# Patient Record
Sex: MALE | Race: WHITE | Employment: FULL TIME | URBAN - METROPOLITAN AREA
[De-identification: names, ages, dates, MRNs, and addresses within clinical notes are randomized per-mention and may not be internally consistent; named-entity substitution may affect disease eponyms.]

---

## 2019-05-28 ENCOUNTER — HOSPITAL ENCOUNTER (OUTPATIENT)
Age: 34
Setting detail: OBSERVATION
Discharge: HOME | End: 2019-05-29
Attending: EMERGENCY MEDICINE | Admitting: INTERNAL MEDICINE
Payer: MEDICAID

## 2019-05-28 ENCOUNTER — APPOINTMENT (OUTPATIENT)
Dept: CT IMAGING | Age: 34
End: 2019-05-28
Payer: MEDICAID

## 2019-05-28 DIAGNOSIS — I65.02 STENOSIS OF LEFT VERTEBRAL ARTERY: ICD-10-CM

## 2019-05-28 DIAGNOSIS — R42 DIZZINESS: ICD-10-CM

## 2019-05-28 DIAGNOSIS — R42 VERTIGO: Primary | ICD-10-CM

## 2019-05-28 LAB
ALBUMIN SERPL-MCNC: 4.6 G/DL (ref 3.5–5.2)
ALP BLD-CCNC: 40 U/L (ref 40–129)
ALT SERPL-CCNC: 29 U/L (ref 0–40)
ANION GAP SERPL CALCULATED.3IONS-SCNC: 12 MMOL/L (ref 7–16)
AST SERPL-CCNC: 33 U/L (ref 0–39)
BASOPHILS ABSOLUTE: 0.05 E9/L (ref 0–0.2)
BASOPHILS RELATIVE PERCENT: 0.7 % (ref 0–2)
BILIRUB SERPL-MCNC: 0.5 MG/DL (ref 0–1.2)
BILIRUBIN URINE: NEGATIVE
BLOOD, URINE: NEGATIVE
BUN BLDV-MCNC: 13 MG/DL (ref 6–20)
CALCIUM SERPL-MCNC: 9.9 MG/DL (ref 8.6–10.2)
CHLORIDE BLD-SCNC: 97 MMOL/L (ref 98–107)
CLARITY: CLEAR
CO2: 29 MMOL/L (ref 22–29)
COLOR: YELLOW
CREAT SERPL-MCNC: 1.1 MG/DL (ref 0.7–1.2)
EKG ATRIAL RATE: 51 BPM
EKG P AXIS: 46 DEGREES
EKG P-R INTERVAL: 180 MS
EKG Q-T INTERVAL: 396 MS
EKG QRS DURATION: 90 MS
EKG QTC CALCULATION (BAZETT): 364 MS
EKG R AXIS: 18 DEGREES
EKG T AXIS: 43 DEGREES
EKG VENTRICULAR RATE: 51 BPM
EOSINOPHILS ABSOLUTE: 0.23 E9/L (ref 0.05–0.5)
EOSINOPHILS RELATIVE PERCENT: 3 % (ref 0–6)
GFR AFRICAN AMERICAN: >60
GFR NON-AFRICAN AMERICAN: >60 ML/MIN/1.73
GLUCOSE BLD-MCNC: 89 MG/DL (ref 74–99)
GLUCOSE URINE: NEGATIVE MG/DL
HCT VFR BLD CALC: 44 % (ref 37–54)
HEMOGLOBIN: 14.4 G/DL (ref 12.5–16.5)
IMMATURE GRANULOCYTES #: 0.02 E9/L
IMMATURE GRANULOCYTES %: 0.3 % (ref 0–5)
KETONES, URINE: NEGATIVE MG/DL
LEUKOCYTE ESTERASE, URINE: NEGATIVE
LYMPHOCYTES ABSOLUTE: 2.85 E9/L (ref 1.5–4)
LYMPHOCYTES RELATIVE PERCENT: 37.5 % (ref 20–42)
MAGNESIUM: 2.1 MG/DL (ref 1.6–2.6)
MCH RBC QN AUTO: 29.8 PG (ref 26–35)
MCHC RBC AUTO-ENTMCNC: 32.7 % (ref 32–34.5)
MCV RBC AUTO: 90.9 FL (ref 80–99.9)
MONOCYTES ABSOLUTE: 0.62 E9/L (ref 0.1–0.95)
MONOCYTES RELATIVE PERCENT: 8.1 % (ref 2–12)
NEUTROPHILS ABSOLUTE: 3.84 E9/L (ref 1.8–7.3)
NEUTROPHILS RELATIVE PERCENT: 50.4 % (ref 43–80)
NITRITE, URINE: NEGATIVE
PDW BLD-RTO: 13.8 FL (ref 11.5–15)
PH UA: 7 (ref 5–9)
PLATELET # BLD: 249 E9/L (ref 130–450)
PMV BLD AUTO: 10.5 FL (ref 7–12)
POTASSIUM SERPL-SCNC: 4.3 MMOL/L (ref 3.5–5)
PROTEIN UA: NEGATIVE MG/DL
RBC # BLD: 4.84 E12/L (ref 3.8–5.8)
SODIUM BLD-SCNC: 138 MMOL/L (ref 132–146)
SPECIFIC GRAVITY UA: <=1.005 (ref 1–1.03)
TOTAL PROTEIN: 6.8 G/DL (ref 6.4–8.3)
TROPONIN: <0.01 NG/ML (ref 0–0.03)
TSH SERPL DL<=0.05 MIU/L-ACNC: 4.82 UIU/ML (ref 0.27–4.2)
UROBILINOGEN, URINE: 0.2 E.U./DL
WBC # BLD: 7.6 E9/L (ref 4.5–11.5)

## 2019-05-28 PROCEDURE — 83735 ASSAY OF MAGNESIUM: CPT

## 2019-05-28 PROCEDURE — 93010 ELECTROCARDIOGRAM REPORT: CPT | Performed by: INTERNAL MEDICINE

## 2019-05-28 PROCEDURE — 2580000003 HC RX 258: Performed by: NURSE PRACTITIONER

## 2019-05-28 PROCEDURE — G0378 HOSPITAL OBSERVATION PER HR: HCPCS

## 2019-05-28 PROCEDURE — 81003 URINALYSIS AUTO W/O SCOPE: CPT

## 2019-05-28 PROCEDURE — 70496 CT ANGIOGRAPHY HEAD: CPT

## 2019-05-28 PROCEDURE — 80053 COMPREHEN METABOLIC PANEL: CPT

## 2019-05-28 PROCEDURE — 84443 ASSAY THYROID STIM HORMONE: CPT

## 2019-05-28 PROCEDURE — 99255 IP/OBS CONSLTJ NEW/EST HI 80: CPT | Performed by: INTERNAL MEDICINE

## 2019-05-28 PROCEDURE — 2580000003 HC RX 258: Performed by: RADIOLOGY

## 2019-05-28 PROCEDURE — 6370000000 HC RX 637 (ALT 250 FOR IP): Performed by: INTERNAL MEDICINE

## 2019-05-28 PROCEDURE — 36415 COLL VENOUS BLD VENIPUNCTURE: CPT

## 2019-05-28 PROCEDURE — 93005 ELECTROCARDIOGRAM TRACING: CPT | Performed by: NURSE PRACTITIONER

## 2019-05-28 PROCEDURE — 99285 EMERGENCY DEPT VISIT HI MDM: CPT

## 2019-05-28 PROCEDURE — 6370000000 HC RX 637 (ALT 250 FOR IP): Performed by: NURSE PRACTITIONER

## 2019-05-28 PROCEDURE — 6360000002 HC RX W HCPCS: Performed by: INTERNAL MEDICINE

## 2019-05-28 PROCEDURE — 96372 THER/PROPH/DIAG INJ SC/IM: CPT

## 2019-05-28 PROCEDURE — 84484 ASSAY OF TROPONIN QUANT: CPT

## 2019-05-28 PROCEDURE — 6360000004 HC RX CONTRAST MEDICATION: Performed by: RADIOLOGY

## 2019-05-28 PROCEDURE — 2580000003 HC RX 258: Performed by: INTERNAL MEDICINE

## 2019-05-28 PROCEDURE — 85025 COMPLETE CBC W/AUTO DIFF WBC: CPT

## 2019-05-28 RX ORDER — ONDANSETRON 4 MG/1
4 TABLET, ORALLY DISINTEGRATING ORAL EVERY 8 HOURS PRN
Qty: 24 TABLET | Refills: 0 | Status: SHIPPED | OUTPATIENT
Start: 2019-05-28 | End: 2019-05-29 | Stop reason: SDUPTHER

## 2019-05-28 RX ORDER — SODIUM CHLORIDE 0.9 % (FLUSH) 0.9 %
10 SYRINGE (ML) INJECTION PRN
Status: DISCONTINUED | OUTPATIENT
Start: 2019-05-28 | End: 2019-05-28 | Stop reason: SDUPTHER

## 2019-05-28 RX ORDER — ONDANSETRON 4 MG/1
4 TABLET, ORALLY DISINTEGRATING ORAL EVERY 8 HOURS PRN
Status: DISCONTINUED | OUTPATIENT
Start: 2019-05-28 | End: 2019-05-29 | Stop reason: HOSPADM

## 2019-05-28 RX ORDER — SODIUM CHLORIDE 0.9 % (FLUSH) 0.9 %
10 SYRINGE (ML) INJECTION PRN
Status: DISCONTINUED | OUTPATIENT
Start: 2019-05-28 | End: 2019-05-29 | Stop reason: HOSPADM

## 2019-05-28 RX ORDER — SODIUM CHLORIDE 0.9 % (FLUSH) 0.9 %
10 SYRINGE (ML) INJECTION EVERY 12 HOURS SCHEDULED
Status: DISCONTINUED | OUTPATIENT
Start: 2019-05-28 | End: 2019-05-28 | Stop reason: SDUPTHER

## 2019-05-28 RX ORDER — 0.9 % SODIUM CHLORIDE 0.9 %
1000 INTRAVENOUS SOLUTION INTRAVENOUS ONCE
Status: COMPLETED | OUTPATIENT
Start: 2019-05-28 | End: 2019-05-28

## 2019-05-28 RX ORDER — MECLIZINE HCL 12.5 MG/1
25 TABLET ORAL ONCE
Status: COMPLETED | OUTPATIENT
Start: 2019-05-28 | End: 2019-05-28

## 2019-05-28 RX ORDER — ACETAMINOPHEN 325 MG/1
650 TABLET ORAL EVERY 4 HOURS PRN
Status: DISCONTINUED | OUTPATIENT
Start: 2019-05-28 | End: 2019-05-29 | Stop reason: HOSPADM

## 2019-05-28 RX ORDER — SODIUM CHLORIDE 0.9 % (FLUSH) 0.9 %
10 SYRINGE (ML) INJECTION PRN
Status: COMPLETED | OUTPATIENT
Start: 2019-05-28 | End: 2019-05-28

## 2019-05-28 RX ORDER — ONDANSETRON 2 MG/ML
4 INJECTION INTRAMUSCULAR; INTRAVENOUS EVERY 6 HOURS PRN
Status: DISCONTINUED | OUTPATIENT
Start: 2019-05-28 | End: 2019-05-29 | Stop reason: HOSPADM

## 2019-05-28 RX ORDER — MECLIZINE HYDROCHLORIDE 25 MG/1
25 TABLET ORAL 3 TIMES DAILY PRN
Qty: 30 TABLET | Refills: 0 | Status: SHIPPED | OUTPATIENT
Start: 2019-05-28 | End: 2019-05-29 | Stop reason: SDUPTHER

## 2019-05-28 RX ORDER — SODIUM CHLORIDE 0.9 % (FLUSH) 0.9 %
10 SYRINGE (ML) INJECTION EVERY 12 HOURS SCHEDULED
Status: DISCONTINUED | OUTPATIENT
Start: 2019-05-28 | End: 2019-05-29 | Stop reason: HOSPADM

## 2019-05-28 RX ORDER — ASPIRIN 81 MG/1
81 TABLET, CHEWABLE ORAL DAILY
Status: DISCONTINUED | OUTPATIENT
Start: 2019-05-28 | End: 2019-05-29 | Stop reason: HOSPADM

## 2019-05-28 RX ADMIN — ENOXAPARIN SODIUM 40 MG: 40 INJECTION SUBCUTANEOUS at 10:35

## 2019-05-28 RX ADMIN — Medication 10 ML: at 04:52

## 2019-05-28 RX ADMIN — MECLIZINE 25 MG: 12.5 TABLET ORAL at 03:02

## 2019-05-28 RX ADMIN — ASPIRIN 81 MG 81 MG: 81 TABLET ORAL at 10:35

## 2019-05-28 RX ADMIN — IOPAMIDOL 60 ML: 755 INJECTION, SOLUTION INTRAVENOUS at 04:52

## 2019-05-28 RX ADMIN — Medication 10 ML: at 20:53

## 2019-05-28 RX ADMIN — SODIUM CHLORIDE 1000 ML: 9 INJECTION, SOLUTION INTRAVENOUS at 02:54

## 2019-05-28 NOTE — ED PROVIDER NOTES
independent   HPI:  5/28/19, Time: 1:36 AM         Pam Delgado is a 27 y.o. male presenting to the ED for concerns regarding feeling dizzy and confused. Patient reports that on Thursday he had a headache. States that was to his left forehead area. He reports that he felt dizzy. He states that Friday he just did not feel well all day and in fact slept most of the day away but did report on Friday that he felt confused and felt that he had difficulty expressing his words, he reports he felt drunk. Jeniferia Imani He reports that then Saturday and Sunday he was starting to feel better but now Monday he's once again started to feel just foggy. He denies any recent head injury or fall. He is not on any anticoagulation therapy. He reports that he does have anxiety and thought maybe it was more of a panic attack. Patient reports concern because he was starting to get better but symptoms once again returned today. Patient denied ever having any unilateral weakness, facial asymmetry or slurring or change in speech he did report that he felt confused and difficulty expressing his words though. Patient otherwise denies any chest pain, shortness breath, abdominal pain as well as no noted nausea, vomiting or diarrhea. Patient also denies any drug or alcohol use. Review of Systems:   Pertinent positives and negatives are stated within HPI, all other systems reviewed and are negative.          --------------------------------------------- PAST HISTORY ---------------------------------------------  Past Medical History:  has a past medical history of Constipation and Diverticulitis. Past Surgical History:  has a past surgical history that includes Tonsillectomy. Social History:  reports that he has never smoked. He has never used smokeless tobacco. He reports that he drinks alcohol. He reports that he does not use drugs. Family History: family history is not on file.      The patients home medications have been reviewed.     Allergies: Sulfa antibiotics    -------------------------------------------------- RESULTS -------------------------------------------------  All laboratory and radiology results have been personally reviewed by myself   LABS:  Results for orders placed or performed during the hospital encounter of 05/28/19   Troponin   Result Value Ref Range    Troponin <0.01 0.00 - 0.03 ng/mL   CBC Auto Differential   Result Value Ref Range    WBC 7.6 4.5 - 11.5 E9/L    RBC 4.84 3.80 - 5.80 E12/L    Hemoglobin 14.4 12.5 - 16.5 g/dL    Hematocrit 44.0 37.0 - 54.0 %    MCV 90.9 80.0 - 99.9 fL    MCH 29.8 26.0 - 35.0 pg    MCHC 32.7 32.0 - 34.5 %    RDW 13.8 11.5 - 15.0 fL    Platelets 847 192 - 861 E9/L    MPV 10.5 7.0 - 12.0 fL    Neutrophils % 50.4 43.0 - 80.0 %    Immature Granulocytes % 0.3 0.0 - 5.0 %    Lymphocytes % 37.5 20.0 - 42.0 %    Monocytes % 8.1 2.0 - 12.0 %    Eosinophils % 3.0 0.0 - 6.0 %    Basophils % 0.7 0.0 - 2.0 %    Neutrophils # 3.84 1.80 - 7.30 E9/L    Immature Granulocytes # 0.02 E9/L    Lymphocytes # 2.85 1.50 - 4.00 E9/L    Monocytes # 0.62 0.10 - 0.95 E9/L    Eosinophils # 0.23 0.05 - 0.50 E9/L    Basophils # 0.05 0.00 - 0.20 E9/L   Comprehensive Metabolic Panel   Result Value Ref Range    Sodium 138 132 - 146 mmol/L    Potassium 4.3 3.5 - 5.0 mmol/L    Chloride 97 (L) 98 - 107 mmol/L    CO2 29 22 - 29 mmol/L    Anion Gap 12 7 - 16 mmol/L    Glucose 89 74 - 99 mg/dL    BUN 13 6 - 20 mg/dL    CREATININE 1.1 0.7 - 1.2 mg/dL    GFR Non-African American >60 >=60 mL/min/1.73    GFR African American >60     Calcium 9.9 8.6 - 10.2 mg/dL    Total Protein 6.8 6.4 - 8.3 g/dL    Alb 4.6 3.5 - 5.2 g/dL    Total Bilirubin 0.5 0.0 - 1.2 mg/dL    Alkaline Phosphatase 40 40 - 129 U/L    ALT 29 0 - 40 U/L    AST 33 0 - 39 U/L   Magnesium   Result Value Ref Range    Magnesium 2.1 1.6 - 2.6 mg/dL   Urinalysis   Result Value Ref Range    Color, UA Yellow Straw/Yellow    Clarity, UA Clear Clear    Glucose, Ur Negative Negative mg/dL    Bilirubin Urine Negative Negative    Ketones, Urine Negative Negative mg/dL    Specific Gravity, UA <=1.005 1.005 - 1.030    Blood, Urine Negative Negative    pH, UA 7.0 5.0 - 9.0    Protein, UA Negative Negative mg/dL    Urobilinogen, Urine 0.2 <2.0 E.U./dL    Nitrite, Urine Negative Negative    Leukocyte Esterase, Urine Negative Negative   EKG 12 Lead   Result Value Ref Range    Ventricular Rate 51 BPM    Atrial Rate 51 BPM    P-R Interval 180 ms    QRS Duration 90 ms    Q-T Interval 396 ms    QTc Calculation (Bazett) 364 ms    P Axis 46 degrees    R Axis 18 degrees    T Axis 43 degrees       RADIOLOGY:  Interpreted by Radiologist.  CTA HEAD W CONTRAST   Final Result   1. Moderate left vertebral artery stenosis. 2. No large vessel occlusion. 3. No intracranial aneurysm. This report has been electronically signed by Jude Keith MD.          ------------------------- NURSING NOTES AND VITALS REVIEWED ---------------------------   The nursing notes within the ED encounter and vital signs as below have been reviewed. /73   Pulse (!) 49   Temp 97.9 °F (36.6 °C) (Oral)   Resp 16   Ht 6' (1.829 m)   Wt 250 lb (113.4 kg)   SpO2 98%   BMI 33.91 kg/m²   Oxygen Saturation Interpretation: Normal      ---------------------------------------------------PHYSICAL EXAM--------------------------------------       Constitutional/General: Alert and oriented x3, well appearing, non toxic in NAD  Head: Normocephalic and atraumatic  Eyes: PERRL, EOMI  Mouth: Oropharynx clear, handling secretions, no trismus  Neck: Supple, full ROM,   Pulmonary: Lungs clear to auscultation bilaterally, no wheezes, rales, or rhonchi. Not in respiratory distress  Cardiovascular:  Regular rate and rhythm, no murmurs, gallops, or rubs. 2+ distal pulses  Abdomen: Soft, non tender, non distended,   Extremities: Moves all extremities x 4.  Warm and well perfused  Skin: warm and dry without rash  Neurologic: GCS 15, cranial nerves II through XII grossly intact. No acute neurovascular deficit noted. Speech clear and coherent extremity equal bilaterally  Psych: Normal Affect      ------------------------------ ED COURSE/MEDICAL DECISION MAKING----------------------  Medications   0.9 % sodium chloride bolus (0 mLs Intravenous Stopped 5/28/19 0516)   meclizine (ANTIVERT) tablet 25 mg (25 mg Oral Given 5/28/19 0302)   sodium chloride flush 0.9 % injection 10 mL (10 mLs Intravenous Given 5/28/19 0452)   iopamidol (ISOVUE-370) 76 % injection 60 mL (60 mLs Intravenous Given 5/28/19 0452)         ED COURSE:       Medical Decision Making: Please will be for labs will also obtain imaging. Patient will also receive IV fluids. 12-lead EKG interpreted showing a heart rate of 51, sinus bradycardia with sinus arrhythmia. No acute ST elevation or depression noted. Labs resulted troponin negative, CBC negative, chemistry panel all within normal limits, magnesium level normal.  Patient is receiving 1 L of normal saline as well as Antivert. Patient awaiting CT scan. Orthostatic vitals essentially negative. Counseling: The emergency provider has spoken with the patient and discussed todays results, in addition to providing specific details for the plan of care and counseling regarding the diagnosis and prognosis. Questions are answered at this time and they are agreeable with the plan.      --------------------------------- IMPRESSION AND DISPOSITION ---------------------------------    IMPRESSION  1. Vertigo    2. Dizziness    3. Stenosis of left vertebral artery        DISPOSITION  Disposition: admit  Patient condition is stable      NOTE: This report was transcribed using voice recognition software.  Every effort was made to ensure accuracy; however, inadvertent computerized transcription errors may be present    Seen with Ginna:     I have personally performed and/or participated in the history, exam, medical decision making, and procedures and agree with all pertinent clinical information. I have also reviewed and agree with the past medical, family and social history unless otherwise noted. My findings/Plan: Patient presents with complaints of dizziness and speech difficulty that started last Thursday. Patient also reports having gait difficulty as well. Patient reports no chest pain or difficulty breathing or bowel pain. He does report a headache though and reports having dizziness. Speech disturbance improved. Patient awake alert oriented heart and lung exam normal abdomen is soft and nontender patient neurologically intact labs and EKG are reviewed and CTA were reviewed. CT does show stenosis vertebral artery. Patient made aware of findings and I did speak to on-call physician and patient will be admitted.        Caryl Lindsay MD  05/28/19 4691

## 2019-05-28 NOTE — LETTER
Ar[facility]  Phone: [phone]    No name on file. May 29, 2019     Patient: Juani Covert   YOB: 1985   Date of Visit: 5/27/2019       To Whom It May Concern: It is my medical opinion that Javier Freeman may return to work on Friday, May 31, 2019 without restrictions. .    If you have any questions or concerns, please don't hesitate to call.     Sincerely,        Naya Waite, [facility] [place] Nurse [place]

## 2019-05-28 NOTE — CONSULTS
chloride flush, 10 mL, Intravenous, 2 times per day  enoxaparin, 40 mg, Subcutaneous, Daily  aspirin, 81 mg, Oral, Daily      Continuous Infusions:   PRN Meds:sodium chloride flush, acetaminophen, ondansetron, magnesium hydroxide, ondansetron    Allergies:  Sulfa antibiotics    Social History:   TOBACCO:   reports that he has never smoked. He has never used smokeless tobacco.  ETOH:   reports that he drinks alcohol. Past Medical History:       No date: Constipation  No date: Diverticulitis    Past Surgical History:       No date: TONSILLECTOMY      Outside reports reviewed: ER records, historical medical records, lab reports and radiology reports. Patient's medications, allergies, past medical, surgical, social and family histories were reviewed and updated as appropriate. Review of Systems  A comprehensive review of systems was negative except for:      Objective:    Neuro exam 122/82 p 50 (MIN 38) t 98  General: normal orientation and alertness. Cranial nerve testing was normal.  Funduscopic eye exam revealed not testable. Motor exam: normal strength and muscle mass. Deep tendon reflexes were 1+ bilaterally. Plantar responses were flexor bilaterally. Cerebellar exam noted finger to nose without dysmetria. Sensation was normal to joint position sense, light touch and a pin prick . Judithe Clover Assessment:  Near-syncope with significant bradycardia arrhythmia noted. Clinically, a unilateral stenotic vertebral artery is not relevant to his current problems and probably a variation of normal.      Plan:  Suggest cardiac evaluation. Suggest SSRI such as Zoloft for anxiety disorder.

## 2019-05-28 NOTE — H&P
History and Physical                                                                                    Audelia Andrews MD, FACP                   Patient Name: Kelly Oseguera                   Age:  27 y.o. Gender:   male    CC: dizziness    HPI: PER ER:              Kelly Oseguera is a 27 y.o. male presenting to the ED for concerns regarding feeling dizzy and confused. Patient reports that on Thursday he had a headache. States that was to his left forehead area. He reports that he felt dizzy. He states that Friday he just did not feel well all day and in fact slept most of the day away but did report on Friday that he felt confused and felt that he had difficulty expressing his words, he reports he felt drunk. Mamadou Josemanuel He reports that then Saturday and Sunday he was starting to feel better but now Monday he's once again started to feel just foggy. He denies any recent head injury or fall. He is not on any anticoagulation therapy. He reports that he does have anxiety and thought maybe it was more of a panic attack. Patient reports concern because he was starting to get better but symptoms once again returned today. Patient denied ever having any unilateral weakness, facial asymmetry or slurring or change in speech he did report that he felt confused and difficulty expressing his words though. Patient otherwise denies any chest pain, shortness breath, abdominal pain as well as no noted nausea, vomiting or diarrhea. Patient also denies any drug or alcohol use.     This AM I interviewed an examined this patient  He feels well now  He endorsed the above history  He had left frontal headaches that somewhat correlated with the dizziness although not consistently       Past Medical History:   Diagnosis Date    Constipation     Diverticulitis        Past Surgical History:   Procedure Laterality Date    TONSILLECTOMY         No family States mother told him he was allergic to sulfa       The patient's medical records have been reviewed. Review of Systems:   · General: Denies malaise or weakness. Denies fever or chills. · Eyes: No visual changes or diplopia. No swelling or pain. · ENT: No Headaches, tinnitus or vertigo. No mouth sores or sore throat. · Cardiovascular: No chest pain, dyspnea on exertion, palpitations, syncope. · Respiratory: No cough or wheezing, hemoptysis, sob, pleuritic pain. · Gastrointestinal: No abdominal pain, anorexia, hematochezia, melena, hematemesis or change in bowels. · Genitourinary: No dysuria, trouble voiding, or hematuria. No change in urination. · Musculoskeletal:  No joint pain or inflammation. No limb weakness. · Integumentary: No rash or pruritis. No abnormal pigmentation,  masses, hair or nail changes  · Neurological: per HPI  · Psychiatric: No anxiety, or depression. Mood and affect reported as normal  · Endocrine: No temperature intolerance. No polydipsia or polyuria. · Hematologic: No abnormal bruising or bleeding, blood clots or swollen lymph nodes. no anemia, abnormal bleeding/bruising, fever,chills, night sweats, swollen glands. · Allergic/Immunologic: No nasal congestion or hives. Physical Examination:      Wt Readings from Last 3 Encounters:   05/27/19 250 lb (113.4 kg)   05/07/17 250 lb (113.4 kg)     Temp Readings from Last 3 Encounters:   05/28/19 97.9 °F (36.6 °C) (Oral)   05/07/17 98.7 °F (37.1 °C) (Temporal)     BP Readings from Last 3 Encounters:   05/28/19 114/73   05/07/17 133/76     Pulse Readings from Last 3 Encounters:   05/28/19 (!) 49   05/07/17 71       General appearance: Normal, awake, alert no distress. Skin: Color, texture, turgor normal. No rashes or lesions. Head: Normocephalic. No masses, lesions, tenderness or abnormalities   Face: Symmetric no visible lesions  Eyes: Conjunctivae/cornea clear. Sheila Winter. Sclera non icteric. Ears: External appearance normal.  Hearing grossly normal  Nose/Sinuses: Nares normal. No paranasal sinus tenderness. Mouth: Lips and tongue appear normal. Dentition noted  Neck:  Symmetric. No adenopathy. Thyroid symmetric, normal size, without nodules. Trachea is midline. Carotids palpable-and assessed. Chest: Even excursion   Lungs: Clear to auscultation. No rhonchi, crackles or rales. Heart: S1 > S2. Regular rate and rhythm. No gallop rub or murmur. Abdomen: Soft, mildly protuberant, non-tender. BS normal. No masses, organomegaly. Anatomic contours appear normal.   Extremities: No deformities, edema, or skin discoloration. Peripheral perfusion assessed in all exremities. No cyanosis  Musculoskeletal: No unusual pain or swelling. Muscular strength intact. Neuro:   · Cranial nerves grossly intact. · Motor: Strength grossly normal. No focal weakness. · Sensory: grossly normal to touch. · No cerebellar signs---Coordination intact. No romberg noted, although hs had discreet past pointing on the left  Mental status: Awake, alert, cognizant and interactive. Patient appears capable of directing self care   Mood: Normal and appropriate affect  Gait & balance: normal     Labs     CBC:   Lab Results   Component Value Date    WBC 7.6 05/28/2019    RBC 4.84 05/28/2019    HGB 14.4 05/28/2019    HCT 44.0 05/28/2019     05/28/2019    MCV 90.9 05/28/2019     BMP:    Lab Results   Component Value Date     05/28/2019    K 4.3 05/28/2019    CL 97 05/28/2019    CO2 29 05/28/2019    BUN 13 05/28/2019    CREATININE 1.1 05/28/2019    GLUCOSE 89 05/28/2019    GLUCOSE 105 05/18/2012    CALCIUM 9.9 05/28/2019     Hepatic Function Panel:    Lab Results   Component Value Date    ALKPHOS 40 05/28/2019    AST 33 05/28/2019    ALT 29 05/28/2019    PROT 6.8 05/28/2019    LABALBU 4.6 05/28/2019    BILITOT 0.5 05/28/2019     Magnesium:    Lab Results   Component Value Date    MG 2.1 05/28/2019 Cardiac Enzymes:   Lab Results   Component Value Date    TROPONINI <0.01 05/28/2019     LDH:  No results found for: LDH  PT/INR:  No results found for: PROTIME, INR  BNP: No results for input(s): BNP in the last 72 hours. TSH:   Lab Results   Component Value Date    TSH 2.663 05/18/2012      Cardiac Injury Profile:   Recent Labs     05/28/19  0256   TROPONINI <0.01      Lipid Profile: No results found for: TRIG, HDL, LDLCALC, CHOL   Hemoglobin A1C: No components found for: HGBA1C   U/A:   Lab Results   Component Value Date    LEUKOCYTESUR Negative 05/28/2019    PHUR 7.0 05/28/2019    WBCUA NONE 05/07/2017    WBCUA NONE 05/08/2011    RBCUA 1-3 05/07/2017    RBCUA NONE 08/14/2012    BACTERIA RARE 05/07/2017    SPECGRAV <=1.005 05/28/2019    BLOODU Negative 05/28/2019    GLUCOSEU Negative 05/28/2019    GLUCOSEU NEGATIVE 05/08/2011         ADMISSION SCHEDULED MEDS:   No current facility-administered medications for this encounter. Current Outpatient Medications   Medication Sig Dispense Refill    meclizine (ANTIVERT) 25 MG tablet Take 1 tablet by mouth 3 times daily as needed for Dizziness 30 tablet 0    ondansetron (ZOFRAN ODT) 4 MG disintegrating tablet Take 1 tablet by mouth every 8 hours as needed for Nausea or Vomiting 24 tablet 0    FISH OIL by Does not apply route.  Ascorbic Acid (VITAMIN C) 500 MG tablet Take 500 mg by mouth daily.  NONFORMULARY Milk thistle      NONFORMULARY tenurmic         Current  Infusions      Prn Meds      Radiology Review:  CTA HEAD W CONTRAST   Final Result   1. Moderate left vertebral artery stenosis. 2. No large vessel occlusion. 3. No intracranial aneurysm.        This report has been electronically signed by Janee Hernandez MD.            ASSESSMENT:  Dizziness undefined-not reproduced  Confusion undefined-resolved  No focal neurological symptoms or signes except for equivocal discreet past pointing on the left with tracking  No other cerebellar abnormalities on exam      PLAN:  CTA shows unilateral moderate vertebral artery narrowing on the left  He had left sided frontal headaches  Although this does not seem anatomically correlated with symptoms Will fully assess with Neurological opinion and observation    Although he denied any past medication he was taking antivert  He has a personal history of panic attacks  Cisneros Shaker prophylaxsis  Considered         See  Orders  Jenniffer Garza MD, Whitney Klinefelter, American Board of Internal Medicine  Diplomate, [address] [place] [address] Rd., [address] of Geriatric Medicine  7:47 AM  5/28/2019

## 2019-05-28 NOTE — CONSULTS
Patient seen and examined. Chart, labs, imaging studies, EKG and rhythm strips reviewed. Full consult to follow. We were asked to see the patient for sinus bradycardia and near syncope. PHYSICAL EXAMINATION:  VITAL SIGNS:  See HPI. CONSTITUTIONAL:  In general, this is a well-developed, well-nourished,  middle-aged  male, who appears his stated age, who is awake,  alert, cooperative, and in no apparent distress. HEENT:  Eyes:  Conjunctivae pink. No noted xanthomas. Throat, oral  mucosa pink and moist.  NECK:  No stridor. Symmetrical, nontender. No JVD. CHEST:  Symmetrical.  Nontender with palpation. No accessory muscle  use. No intercostal retractions. Lung auscultation clear to all  fields. CARDIOVASCULAR:  No carotid bruit noted. Heart inspection shows no  noted pulsations. Heart palpation, no heaves or thrills. PMI  nondisplaced. Heart auscultation, regular rate and rhythm. No murmur  or rub. PERIPHERAL VASCULAR:  No bilateral lower extremity edema. Pedal pulses  are palpable and equal.  No clubbing or cyanosis. ABDOMEN:  Soft, nontender with palpation. Bowel sounds present x4  quadrants. No palpable masses. No abdominal bruits or pulsation. MUSCULOSKELETAL:  Good muscle strength and tone. No atrophy or abnormal  movements. :  Deferred. SKIN:  Warm and dry. No stasis dermatitis or ulcerations. NEUROLOGIC:  Alert and oriented x3. Speech is clear and appropriate. Follows simple commands. Moves all extremities. Pleasant affect. IMPRESSION:  1. Headache and dizziness   2. Sinus bradycardia with sinus arrhythmia of no clinical significance  3. Anxiety and panic attacks   4. Left vertebral artery stenosis reported on CTA 05/27/2019  5. Obesity  6. Hx of Diverticulitis    PLAN:   1. Check TSH   2. Check echocardiogram   3. Consider CT Scan of the head  4. Consider Vascular Surgery consult   5.  May be discharged from Cardiology standpoint as presenting symptoms are not cardiac  6. Cardiology will sign off. Please call if needed.      Electronically signed by Earl De Jesus MD on 5/28/2019 at 3:33 PM

## 2019-05-28 NOTE — LETTER
[facility]  Phone: [phone]    No name on file. May 29, 2019     Patient: Barrera Olmos   YOB: 1985   Date of Visit: 5/27/2019       To Whom It May Concern: It is my medical opinion that Dacia Jenkins may return to work on Friday, May 31, 2019 without restrictions. .    If you have any questions or concerns, please don't hesitate to call.     Sincerely,        Derek Cee, [facility]

## 2019-05-29 VITALS
RESPIRATION RATE: 16 BRPM | SYSTOLIC BLOOD PRESSURE: 118 MMHG | WEIGHT: 250 LBS | HEART RATE: 51 BPM | HEIGHT: 72 IN | OXYGEN SATURATION: 96 % | DIASTOLIC BLOOD PRESSURE: 67 MMHG | BODY MASS INDEX: 33.86 KG/M2 | TEMPERATURE: 97.7 F

## 2019-05-29 LAB
LV EF: 63 %
LVEF MODALITY: NORMAL

## 2019-05-29 PROCEDURE — G0378 HOSPITAL OBSERVATION PER HR: HCPCS

## 2019-05-29 PROCEDURE — 99255 IP/OBS CONSLTJ NEW/EST HI 80: CPT | Performed by: INTERNAL MEDICINE

## 2019-05-29 PROCEDURE — 2580000003 HC RX 258: Performed by: INTERNAL MEDICINE

## 2019-05-29 PROCEDURE — 93306 TTE W/DOPPLER COMPLETE: CPT

## 2019-05-29 PROCEDURE — 6370000000 HC RX 637 (ALT 250 FOR IP): Performed by: INTERNAL MEDICINE

## 2019-05-29 RX ORDER — MECLIZINE HYDROCHLORIDE 25 MG/1
25 TABLET ORAL 3 TIMES DAILY PRN
Qty: 30 TABLET | Refills: 0 | Status: SHIPPED | OUTPATIENT
Start: 2019-05-29 | End: 2019-06-08

## 2019-05-29 RX ORDER — ONDANSETRON 4 MG/1
4 TABLET, ORALLY DISINTEGRATING ORAL EVERY 8 HOURS PRN
Qty: 24 TABLET | Refills: 0
Start: 2019-05-29

## 2019-05-29 RX ORDER — MECLIZINE HYDROCHLORIDE 25 MG/1
25 TABLET ORAL 3 TIMES DAILY PRN
Qty: 30 TABLET | Refills: 0
Start: 2019-05-29 | End: 2019-05-29 | Stop reason: SDUPTHER

## 2019-05-29 RX ORDER — ASPIRIN 81 MG/1
81 TABLET, CHEWABLE ORAL DAILY
Qty: 30 TABLET | Refills: 3 | Status: SHIPPED | OUTPATIENT
Start: 2019-05-30

## 2019-05-29 RX ADMIN — Medication 10 ML: at 09:25

## 2019-05-29 RX ADMIN — ASPIRIN 81 MG 81 MG: 81 TABLET ORAL at 09:24

## 2019-05-29 NOTE — DISCHARGE SUMMARY
Discharge Summary    Hiral Prather  :  1985  MRN:  [id number]    Admit date:  2019  Discharge date:  2019 10:30 AM    Admitting Physician:  Camilla Pinzon MD    Discharge Diagnoses:    Patient Active Problem List    Diagnosis Date Noted    Vertebral artery stenosis, left 2019    Dizziness 2019       Past Medical Hx :   Past Medical History:   Diagnosis Date    Constipation     Diverticulitis        Past Surgical Hx :   Past Surgical History:   Procedure Laterality Date    TONSILLECTOMY         Admission Condition:  good    Discharged Condition:  good    Labs:  CBC:   Lab Results   Component Value Date    WBC 7.6 2019    RBC 4.84 2019    HGB 14.4 2019    HCT 44.0 2019    MCV 90.9 2019    MCH 29.8 2019    MCHC 32.7 2019    RDW 13.8 2019     2019    MPV 10.5 2019     CMP:    Lab Results   Component Value Date     2019    K 4.3 2019    CL 97 2019    CO2 29 2019    BUN 13 2019    CREATININE 1.1 2019    GFRAA >60 2019    LABGLOM >60 2019    GLUCOSE 89 2019    GLUCOSE 105 2012    PROT 6.8 2019    LABALBU 4.6 2019    CALCIUM 9.9 2019    BILITOT 0.5 2019    ALKPHOS 40 2019    AST 33 2019    ALT 29 2019        Radiology Results: Cta Head W Contrast    Result Date: 2019  EXAM:  CT Angiography Head With Contrast EXAM DATE/TIME:  2019 4:00 AM CLINICAL HISTORY:  27years old, male; Pain and signs and symptoms; Weakness; Headache TECHNIQUE:  Imaging protocol: Axial computed tomographic angiography images of the head with intravenous contrast using CT angiography protocol. 3D rendering: MIP reconstructed images were created and reviewed.   Radiation optimization: All CT scans at this facility use at least one of these dose optimization techniques: automated exposure control; mA and/or kV adjustment per patient size (includes targeted exams where dose is matched to clinical indication); or iterative reconstruction. Contrast material: ISOVUE 370; Contrast volume: 60 ml; Contrast route: IV; COMPARISON:  No relevant prior studies available. FINDINGS:  Right internal carotid artery: Unremarkable. Intracranial segment is patent with no significant stenosis. No aneurysm. Right anterior cerebral artery: Unremarkable. No occlusion or significant stenosis. No aneurysm. Right middle cerebral artery: Unremarkable. No occlusion or significant stenosis. No aneurysm. Right posterior cerebral artery: Unremarkable. No occlusion or significant stenosis. No aneurysm. Right vertebral artery: Right vertebral artery is dominant. Left internal carotid artery: Unremarkable. Intracranial segment is patent with no significant stenosis. No aneurysm. Left anterior cerebral artery: Unremarkable. No occlusion or significant stenosis. No aneurysm. Left middle cerebral artery: Unremarkable. No occlusion or significant stenosis. No aneurysm. Left posterior cerebral artery: Unremarkable. No occlusion or significant stenosis. No aneurysm. Left vertebral artery: Moderate left vertebral artery stenosis. Basilar artery: Unremarkable. No occlusion or significant stenosis. No aneurysm. 1. Moderate left vertebral artery stenosis. 2. No large vessel occlusion. 3. No intracranial aneurysm. This report has been electronically signed by Jazmine Rajan MD.      Hospital Course:     Yordan Morrissey a 27 y. o. male presenting to the ED for concerns regarding feeling dizzy and confused.  Patient reports that on Thursday he had a headache.  States that was to his left forehead area. [facility] reports that he felt dizzy.  He states that Friday he just did not feel well all day and in fact slept most of the day away but did report on Friday that he felt confused and felt that he had difficulty expressing his words, he reports he felt drunk. .    He reports that then Saturday and Sunday he was starting to feel better but now Monday he's once again started to feel just foggy. [facility] denies any recent head injury or fall.    He is not on any anticoagulation therapy. [facility] reports that he does have anxiety and thought maybe it was more of a panic attack.    Patient reports concern because he was starting to get better but symptoms once again returned today. Adrian Norton denied ever having any unilateral weakness, facial asymmetry or slurring or change in speech he did report that he felt confused and difficulty expressing his words though.  Patient otherwise denies any chest pain, shortness breath, abdominal pain as well as no noted nausea, vomiting or diarrhea.  Patient also denies any drug or alcohol use.     This AM I interviewed an examined this patient  He feels well now  He endorsed the above history  He had left frontal headaches that somewhat correlated with the dizziness although not consistently        He was evaluated by Neurology:  Assessment:  Near-syncope with significant bradycardia arrhythmia noted. Clinically, a unilateral stenotic vertebral artery is not relevant to his current problems and probably a variation of normal.    Then Cardiology:  IMPRESSION:  1. Headache and dizziness   2. Sinus bradycardia with sinus arrhythmia of no clinical significance  3. Anxiety and panic attacks   4. Left vertebral artery stenosis reported on CTA 05/27/2019  5. Obesity  6. Hx of Diverticulitis     PLAN:   1. Check TSH marginal and clinically euthyroid  2. Check echocardiogram-pending   3. Consider CT Scan of the head-done  4. May be discharged from Cardiology standpoint as presenting symptoms are not cardiac  5. Cardiology will sign off. Please call if needed.      At this time he feels fine            Discharge Medications:    Nava Alva   Home Medication Instructions [facility]:[id number]    Printed on:05/29/19 1030   Medication Information                      aspirin 81 MG chewable tablet  Take 1 tablet by mouth daily             FISH OIL  by Does not apply route.              meclizine (ANTIVERT) 25 MG tablet  Take 1 tablet by mouth 3 times daily as needed for Dizziness             NONFORMULARY  Milk thistle             NONFORMULARY  tenurmic             ondansetron (ZOFRAN ODT) 4 MG disintegrating tablet  Take 1 tablet by mouth every 8 hours as needed for Nausea or Vomiting                 Discharge Exam:  General Appearance:    Alert, cooperative, no distress, appears stated age   Head:    Normocephalic, without obvious abnormality, atraumatic   Eyes:    PERRL, conjunctiva/corneas clear   Neck:   Symmetrical, trachea midline, no adenopathy;     thyroid:  no enlargement/tenderness/nodules   Lungs:     Clear to auscultation bilaterally, respirations unlabored   Chest Wall:    No tenderness or deformity    Heart:    Regular rate and rhythm, S1 and S2 normal, no murmur, rub   or gallop   Abdomen:     Soft, non-tender, bowel sounds active all four quadrants,     no masses, no organomegaly   Extremities:   Extremities normal, atraumatic, no cyanosis or edema   Pulses:   symmetric all extremities   Skin:   Skin color, texture, turgor normal, no rashes or lesions   Neurologic:   CNII-XII intact, normal strength, sensation and reflexes     throughout     Disposition: home        Patient Instructions:   He is to follow up with Dr Marisela Massey for further evaluation  Started on Nichole Montague to be re-evaluated if even needed    Signed:  Francoise Badillo of Internal Medicine  [employer]  5/29/2019, 10:30 AM

## 2019-05-29 NOTE — CONSULTS
[address]                  [address] [place],  [facility]                                  CONSULTATION    PATIENT NAME: John Myrick                    :        1985  MED REC NO:   [id number]                            ROOM:       8202  ACCOUNT NO:   [de-identified]                           ADMIT DATE: 2019  PROVIDER:     DEREK Palomares      CONSULT DATE:  2019    CONSULTING PROVIDER:  Jose Alfredo Hamilton MD    REASON FOR THE CONSULT:  Bradycardia, near syncope. HISTORY OF PRESENT ILLNESS:  The patient is a 69-year-old   male, who is previously not known to [facility] Cardiology Physicians  in [facility]. His medical history includes diverticulitis,  anxiety, and panic attacks. The patient presented to the [facility] Emergency Department on  2019 with complaints of headache and dizziness. He states that  prior to presenting to the emergency department several days earlier  while he was at work, he developed a left forehead headache and became  dizzy and lightheaded with ambulation. He denies accompanying chest  pain and states, \"I think I was short of breath, I thought I was having  panic attack. \"  He states that he then sat down \"and then I decided I  would go home and sleep it off. \"  He states that following morning he  felt that he his \"balance was off when I got up\" and he states that \"I  needed to concentrate to get the words out to talk. \"  He felt that this  has attributed to dehydration and exertion. Subsequently, he increased  his fluid intake and slept several hours. He states that he woke up  repeatedly and \"felt better every time I woke up. \"  He states that on  2019, he had recurrence of his left forehead headache with  accompanying dizziness and lightheadedness and states that \"I just felt  out of sort like I was having a hard time keeping my balance. \"  He  denies accompanying chest pain or dyspnea and due to the recurrence of  his symptoms, he presented to the emergency department for further  evaluation. Upon arrival to the emergency department, his vital signs  were oral temperature 97.9, heart rate 75 beats per minute, respiratory  rate 16, blood pressure 145/108, oxygen saturation 98% on room air. A  12-lead EKG was obtained that showed sinus bradycardia. WBC 7.6, H and  H 14.4 and 44. BUN/SCR 13/1.1. Urinalysis was negative nitrite and  leukocyte esterase. CTA of the head showed a moderate-to-large left  vertebral artery stenosis. He received a liter of normal saline and  Antivert, was admitted to telemetry monitoring unit for further  evaluation and management and Neurology was consulted. Cardiology was  consulted by Dr. Calla Castleman for further management of bradycardia  and near syncope. Currently, the patient is lying and was completely  flat in bed. Denies chest pain or dyspnea. Vital signs are stable. Telemetry monitoring showed sinus rhythm with sinus bradycardia with  heart rate to as low as 40s (nonsustained, during sleep) and he is in no  acute distress. PAST MEDICAL/SURGICAL HISTORY:  1. Anxiety and panic attacks. 2.  History of diverticulitis. FAMILY HISTORY:  Paternal grandfather with history of MI in his 62s,  further details are unknown. SOCIAL HISTORY:  Denies tobacco, alcohol or illicit drug use. Caffeine  intake includes coffee all day. ALLERGIES:  SULFA. HOME MEDICATIONS:  Fish oil, milk thistle, turmeric and multivitamin. HOSPITAL MEDICATIONS:  Aspirin enteric-coated 81 mg daily, Lovenox 40 mg  daily, Antivert 25 mg x1. REVIEW OF SYSTEMS:  CONSTITUTIONAL:  Denies fevers, chills, night  sweats, weight loss, or fatigue. HEENT:  Complains of headaches. Denies nosebleeds, blurred vision, oral pain, abscess, or lesion. MUSCULOSKELETAL:  Denies falls. Denies pain to bilateral lower  extremities with ambulation.   Denies edema to bilateral lower  extremities. NEURO:  Complains of dizziness and lightheadedness and  unsteady gait. See HPI. Denies numbness or tingling. CARDIAC:  Denies  chest pain, palpitations, feelings of his heart racing. LUNGS:  Denies  orthopnea or PND. GI:  Denies heartburn, nausea, vomiting, diarrhea,  constipation, or black, bloody, or tarry stools. :  Denies dysuria or  hematuria. HEME:  Denies excessive bleeding or bruising. LYMPHS:   Denies lumps or bumps to neck, axilla, breast, and groin. ENDOCRINE:   Denies excessive thirst.  Denies intolerance to hot or cold. PSYCH:   Complains of anxiety. Denies depression. PHYSICAL EXAMINATION:  VITAL SIGNS:  See HPI. CONSTITUTIONAL:  In general, this is a well-developed, well-nourished,  middle-aged  male, who appears his stated age, who is awake,  alert, cooperative, and in no apparent distress. HEENT:  Eyes:  Conjunctivae pink. No noted xanthomas. Throat, oral  mucosa pink and moist.  NECK:  No stridor. Symmetrical, nontender. No JVD. CHEST:  Symmetrical.  Nontender with palpation. No accessory muscle  use. No intercostal retractions. Lung auscultation clear to all  fields. CARDIOVASCULAR:  No carotid bruit noted. Heart inspection shows no  noted pulsations. Heart palpation, no heaves or thrills. PMI  nondisplaced. Heart auscultation, regular rate and rhythm. No murmur  or rub. PERIPHERAL VASCULAR:  No bilateral lower extremity edema. Pedal pulses  are palpable and equal.  No clubbing or cyanosis. ABDOMEN:  Soft, nontender with palpation. Bowel sounds present x4  quadrants. No palpable masses. No abdominal bruits or pulsation. MUSCULOSKELETAL:  Good muscle strength and tone. No atrophy or abnormal  movements. :  Deferred. SKIN:  Warm and dry. No stasis dermatitis or ulcerations. NEUROLOGIC:  Alert and oriented x3. Speech is clear and appropriate. Follows simple commands. Moves all extremities.   Pleasant affect. LABS/TESTS:  Sodium 138, potassium 4.3, chloride 97, CO2 of 29, BUN 13,  creatinine 1.1, blood glucose 89, calcium 9.9. GFR greater than 60. Magnesium 2.1. Urinalysis negative nitrite and leukocyte esterase. WBC  is 7.6, hemoglobin 14.4, hematocrit 44, platelets 298.785. CT of the  head, moderate left vertebral artery stenosis, no large vessel occlusion  or intracranial aneurysm. IMPRESSION as per Dr. Marc Cross:  1. Headache and dizziness. 2.  Sinus bradycardia with sinus arrhythmia of no clinical significance. 3.  Anxiety and panic attacks. 4.  Left vertebral artery stenosis reported on CTA on 05/27/2019.  5.  Obesity. 6.  History of diverticulitis. PLAN as per Dr. Marc Cross:  1. Check TSH. 2.  Chest echocardiogram.  3.  Consider CT scan of the head. 4.  Consider Vascular Surgery consult. 5.  May be discharged from cardiology standpoint as presenting symptoms  are not cardiac. 6.  Cardiology will sign off. Please call if needed. DEREK Ly    D: 05/29/2019 7:35:55       T: 05/29/2019 8:15:21     [email or web address]  Job#: [id number]     Doc#: [id number]    CC:    I personally and independently saw and examined patient and reviewed all done pertinent laboratory data, imaging studies, ECGs and rhythm strips. I have reviewed and agree with the APN history and physical exam as documented in the above note.     Electronically signed by Rossy Mcbride MD on 5/29/2019 at 2:50 PM

## 2019-06-26 ENCOUNTER — HOSPITAL ENCOUNTER (OUTPATIENT)
Age: 34
Discharge: HOME | End: 2019-06-28

## 2019-06-26 PROCEDURE — 88305 TISSUE EXAM BY PATHOLOGIST: CPT

## 2020-11-19 ENCOUNTER — APPOINTMENT (OUTPATIENT)
Dept: GENERAL RADIOLOGY | Age: 35
End: 2020-11-19
Payer: MEDICAID

## 2020-11-19 ENCOUNTER — HOSPITAL ENCOUNTER (EMERGENCY)
Age: 35
Discharge: HOME | End: 2020-11-19
Attending: EMERGENCY MEDICINE
Payer: MEDICAID

## 2020-11-19 ENCOUNTER — APPOINTMENT (OUTPATIENT)
Dept: ULTRASOUND IMAGING | Age: 35
End: 2020-11-19
Payer: MEDICAID

## 2020-11-19 VITALS
RESPIRATION RATE: 18 BRPM | BODY MASS INDEX: 33.86 KG/M2 | DIASTOLIC BLOOD PRESSURE: 72 MMHG | TEMPERATURE: 98.4 F | HEART RATE: 70 BPM | WEIGHT: 250 LBS | OXYGEN SATURATION: 98 % | HEIGHT: 72 IN | SYSTOLIC BLOOD PRESSURE: 133 MMHG

## 2020-11-19 LAB
BASOPHILS ABSOLUTE: 0.03 E9/L (ref 0–0.2)
BASOPHILS RELATIVE PERCENT: 0.5 % (ref 0–2)
C-REACTIVE PROTEIN: 2.2 MG/DL (ref 0–0.4)
EOSINOPHILS ABSOLUTE: 0.1 E9/L (ref 0.05–0.5)
EOSINOPHILS RELATIVE PERCENT: 1.5 % (ref 0–6)
HCT VFR BLD CALC: 46.8 % (ref 37–54)
HEMOGLOBIN: 15.1 G/DL (ref 12.5–16.5)
IMMATURE GRANULOCYTES #: 0.02 E9/L
IMMATURE GRANULOCYTES %: 0.3 % (ref 0–5)
LYMPHOCYTES ABSOLUTE: 1.06 E9/L (ref 1.5–4)
LYMPHOCYTES RELATIVE PERCENT: 16.2 % (ref 20–42)
MCH RBC QN AUTO: 28.9 PG (ref 26–35)
MCHC RBC AUTO-ENTMCNC: 32.3 % (ref 32–34.5)
MCV RBC AUTO: 89.7 FL (ref 80–99.9)
MONOCYTES ABSOLUTE: 1.09 E9/L (ref 0.1–0.95)
MONOCYTES RELATIVE PERCENT: 16.6 % (ref 2–12)
NEUTROPHILS ABSOLUTE: 4.26 E9/L (ref 1.8–7.3)
NEUTROPHILS RELATIVE PERCENT: 64.9 % (ref 43–80)
PDW BLD-RTO: 15.6 FL (ref 11.5–15)
PLATELET # BLD: 279 E9/L (ref 130–450)
PMV BLD AUTO: 9.9 FL (ref 7–12)
RBC # BLD: 5.22 E12/L (ref 3.8–5.8)
SEDIMENTATION RATE, ERYTHROCYTE: 29 MM/HR (ref 0–15)
WBC # BLD: 6.6 E9/L (ref 4.5–11.5)

## 2020-11-19 PROCEDURE — 99284 EMERGENCY DEPT VISIT MOD MDM: CPT

## 2020-11-19 PROCEDURE — 73060 X-RAY EXAM OF HUMERUS: CPT

## 2020-11-19 PROCEDURE — 85651 RBC SED RATE NONAUTOMATED: CPT

## 2020-11-19 PROCEDURE — 85025 COMPLETE CBC W/AUTO DIFF WBC: CPT

## 2020-11-19 PROCEDURE — 93971 EXTREMITY STUDY: CPT | Performed by: RADIOLOGY

## 2020-11-19 PROCEDURE — 86140 C-REACTIVE PROTEIN: CPT

## 2020-11-19 PROCEDURE — 6360000002 HC RX W HCPCS: Performed by: EMERGENCY MEDICINE

## 2020-11-19 PROCEDURE — 96374 THER/PROPH/DIAG INJ IV PUSH: CPT

## 2020-11-19 PROCEDURE — 93971 EXTREMITY STUDY: CPT

## 2020-11-19 RX ORDER — KETOROLAC TROMETHAMINE 30 MG/ML
15 INJECTION, SOLUTION INTRAMUSCULAR; INTRAVENOUS ONCE
Status: COMPLETED | OUTPATIENT
Start: 2020-11-19 | End: 2020-11-19

## 2020-11-19 RX ORDER — CEPHALEXIN 500 MG/1
500 CAPSULE ORAL 4 TIMES DAILY
Qty: 28 CAPSULE | Refills: 0 | Status: SHIPPED | OUTPATIENT
Start: 2020-11-19 | End: 2020-11-26

## 2020-11-19 RX ADMIN — KETOROLAC TROMETHAMINE 15 MG: 30 INJECTION, SOLUTION INTRAMUSCULAR at 19:38

## 2020-11-19 ASSESSMENT — ENCOUNTER SYMPTOMS
WHEEZING: 0
ABDOMINAL PAIN: 0
EYE PAIN: 0
SHORTNESS OF BREATH: 0
NAUSEA: 0
SINUS PRESSURE: 0
SORE THROAT: 0
VOMITING: 0
DIARRHEA: 0
COUGH: 0
EYE DISCHARGE: 0
EYE REDNESS: 0
BACK PAIN: 0

## 2020-11-19 ASSESSMENT — PAIN SCALES - GENERAL
PAINLEVEL_OUTOF10: 5
PAINLEVEL_OUTOF10: 5

## 2020-11-19 NOTE — ED PROVIDER NOTES
Patient presents with left arm swelling. Patient states that this began yesterday when he had some discomfort in his left upper arm. It has progressed throughout the day and is again more more swollen and painful. States that there is also been some accompanying redness with it. He went to the urgent care who recommended he come to the ED for an ultrasound. Patient is denying any chest pain or shortness of breath. He has not had any fevers or chills and denies any sick contacts. Patient denies IV drug abuse. He denies any numbness, tingling, or weakness in his hand. The history is provided by the patient. No  was used. Arm Injury   Location:  Arm  Arm location:  L upper arm  Injury: no    Pain details:     Quality:  Aching    Radiates to:  Does not radiate    Severity:  Moderate    Onset quality:  Gradual    Duration:  1 day    Timing:  Constant    Progression:  Worsening  Handedness:  Right-handed  Relieved by:  Nothing  Worsened by:  Nothing  Associated symptoms: swelling    Associated symptoms: no back pain, no fever, no numbness and no tingling         Review of Systems   Constitutional: Negative for chills and fever. HENT: Negative for ear pain, sinus pressure and sore throat. Eyes: Negative for pain, discharge and redness. Respiratory: Negative for cough, shortness of breath and wheezing. Cardiovascular: Negative for chest pain. Gastrointestinal: Negative for abdominal pain, diarrhea, nausea and vomiting. Genitourinary: Negative for dysuria and frequency. Musculoskeletal: Negative for arthralgias and back pain. Swelling and erythema of the left upper arm. Skin: Negative for rash and wound. Neurological: Negative for weakness and headaches. Hematological: Negative for adenopathy. All other systems reviewed and are negative. Physical Exam  Vitals signs and nursing note reviewed.    Constitutional:       General: He is not in acute distress. Appearance: Normal appearance. He is well-developed. He is obese. He is not ill-appearing or diaphoretic. HENT:      Head: Normocephalic and atraumatic. Eyes:      Conjunctiva/sclera: Conjunctivae normal.   Neck:      Musculoskeletal: Normal range of motion and neck supple. No neck rigidity or muscular tenderness. Cardiovascular:      Rate and Rhythm: Normal rate and regular rhythm. Pulses: Normal pulses. Heart sounds: Normal heart sounds. No murmur. Pulmonary:      Effort: Pulmonary effort is normal. No respiratory distress. Breath sounds: Normal breath sounds. No wheezing or rales. Abdominal:      General: Bowel sounds are normal.      Palpations: Abdomen is soft. Tenderness: There is no abdominal tenderness. There is no guarding or rebound. Musculoskeletal:         General: Swelling and tenderness present. No deformity. Skin:     General: Skin is warm and dry. Findings: Erythema (Mild) present. Comments: Hard mass on lateral upper arm. Is not fluctuant. Neurological:      Mental Status: He is alert and oriented to person, place, and time. Cranial Nerves: No cranial nerve deficit. Coordination: Coordination normal.          Procedures     MDM  Number of Diagnoses or Management Options  Left arm pain:   Diagnosis management comments: Patient presents with a swollen tender upper extremity. Concern for DVT. Patient given Toradol with improvement. Ultrasound did not show any thrombi. X-ray of the left humerus did not show any soft tissue abnormalities. Patient does have an elevated CRP at 2.2. ESR is pending. Patient does not have any fever or chills. He is not showing any systemic symptoms of infection. Patient's vitals have been stable throughout his stay with us. This point there is not appear to be any emergent processes ongoing. The mass is not fluctuant and there is nothing to drain.   Patient will be given Keflex for gram-positive coverage. He was also educated on when to return to the ED for reevaluation. Advised to follow-up with his PCP for further evaluation and treatment. Patient expresses understanding and is agreeable. Patient to be discharged. Amount and/or Complexity of Data Reviewed  Clinical lab tests: reviewed  Tests in the radiology section of CPT®: reviewed              --------------------------------------------- PAST HISTORY ---------------------------------------------  Past Medical History:  has a past medical history of Constipation and Diverticulitis. Past Surgical History:  has a past surgical history that includes Tonsillectomy. Social History:  reports that he has never smoked. He has never used smokeless tobacco. He reports current alcohol use. He reports that he does not use drugs. Family History: family history is not on file. The patients home medications have been reviewed.     Allergies: Sulfa antibiotics    -------------------------------------------------- RESULTS -------------------------------------------------  Labs:  Results for orders placed or performed during the hospital encounter of 11/19/20   C-REACTIVE PROTEIN   Result Value Ref Range    CRP 2.2 (H) 0.0 - 0.4 mg/dL   CBC Auto Differential   Result Value Ref Range    WBC 6.6 4.5 - 11.5 E9/L    RBC 5.22 3.80 - 5.80 E12/L    Hemoglobin 15.1 12.5 - 16.5 g/dL    Hematocrit 46.8 37.0 - 54.0 %    MCV 89.7 80.0 - 99.9 fL    MCH 28.9 26.0 - 35.0 pg    MCHC 32.3 32.0 - 34.5 %    RDW 15.6 (H) 11.5 - 15.0 fL    Platelets 410 272 - 639 E9/L    MPV 9.9 7.0 - 12.0 fL    Neutrophils % 64.9 43.0 - 80.0 %    Immature Granulocytes % 0.3 0.0 - 5.0 %    Lymphocytes % 16.2 (L) 20.0 - 42.0 %    Monocytes % 16.6 (H) 2.0 - 12.0 %    Eosinophils % 1.5 0.0 - 6.0 %    Basophils % 0.5 0.0 - 2.0 %    Neutrophils Absolute 4.26 1.80 - 7.30 E9/L    Immature Granulocytes # 0.02 E9/L    Lymphocytes Absolute 1.06 (L) 1.50 - 4.00 E9/L    Monocytes Absolute 1.09 (H) 0.10 - 0.95 E9/L    Eosinophils Absolute 0.10 0.05 - 0.50 E9/L    Basophils Absolute 0.03 0.00 - 0.20 E9/L       Radiology:  XR HUMERUS LEFT (MIN 2 VIEWS)   Final Result   No acute osseous abnormality. US DUP UPPER EXTREMITY LEFT VENOUS   Final Result   Within the visualized vessels there is no evidence for deep venous   thrombosis          ------------------------- NURSING NOTES AND VITALS REVIEWED ---------------------------  Date / Time Roomed:  11/19/2020  4:27 PM  ED Bed Assignment:  18B/18B-18    The nursing notes within the ED encounter and vital signs as below have been reviewed. BP (!) 156/88   Pulse 77   Temp 98.4 °F (36.9 °C) (Oral)   Resp 18   Ht 6' (1.829 m)   Wt 250 lb (113.4 kg)   SpO2 97%   BMI 33.91 kg/m²   Oxygen Saturation Interpretation: Normal      ------------------------------------------ PROGRESS NOTES ------------------------------------------  8:24 PM EST  I have spoken with the patient and discussed todays results, in addition to providing specific details for the plan of care and counseling regarding the diagnosis and prognosis. Their questions are answered at this time and they are agreeable with the plan. I discussed at length with them reasons for immediate return here for re evaluation. They will followup with their primary care physician by calling their office tomorrow. --------------------------------- ADDITIONAL PROVIDER NOTES ---------------------------------  At this time the patient is without objective evidence of an acute process requiring hospitalization or inpatient management. They have remained hemodynamically stable throughout their entire ED visit and are stable for discharge with outpatient follow-up. The plan has been discussed in detail and they are aware of the specific conditions for emergent return, as well as the importance of follow-up.       New Prescriptions    CEPHALEXIN (KEFLEX) 500 MG CAPSULE    Take 1 capsule by mouth 4 times daily for 7 days       Diagnosis:  1. Left arm pain        Disposition:  Patient's disposition: Discharge to home  Patient's condition is stable.     Patient was seen and evaluated by both myself and Marcin Salazar, [address] [place] [address],   Resident  11/19/20 [id number]

## 2022-03-07 ENCOUNTER — HOSPITAL ENCOUNTER (OUTPATIENT)
Dept: CT IMAGING | Age: 37
Discharge: HOME | End: 2022-03-09
Payer: MEDICAID

## 2022-03-07 DIAGNOSIS — R10.11 ABDOMINAL PAIN, RIGHT UPPER QUADRANT: ICD-10-CM

## 2022-03-07 PROCEDURE — 74176 CT ABD & PELVIS W/O CONTRAST: CPT

## 2024-10-14 ENCOUNTER — HOSPITAL ENCOUNTER (OUTPATIENT)
Dept: GENERAL RADIOLOGY | Age: 39
Discharge: HOME | End: 2024-10-16
Payer: COMMERCIAL

## 2024-10-14 ENCOUNTER — HOSPITAL ENCOUNTER (OUTPATIENT)
Age: 39
Discharge: HOME | End: 2024-10-16
Payer: COMMERCIAL

## 2024-10-14 DIAGNOSIS — R05.3 CHRONIC COUGH: ICD-10-CM

## 2024-10-14 PROCEDURE — 71046 X-RAY EXAM CHEST 2 VIEWS: CPT
